# Patient Record
(demographics unavailable — no encounter records)

---

## 2024-10-18 NOTE — PROCEDURE
[No] : not [NSR] : normal sinus rhythm [Pacemaker] : pacemaker [DDDR] : DDDR [Voltage: ___ volts] : Voltage was [unfilled] volts [Normal] : The battery status is normal. [Lead Imp:  ___ohms] : lead impedance was [unfilled] ohms [Sensing Amplitude ___mv] : sensing amplitude was [unfilled] mv [___V @] : [unfilled] V [___ ms] : [unfilled] ms [Outputs/Safety Margin] : output changed to allow for adequate safety margin [Programmed for Longevity] : output reprogrammed for improved battery longevity [de-identified] : AS - VS @ 70 bpm [de-identified] : [employer] [de-identified] : Mino [id number] / [id number] [de-identified] : [id number] (RA) / [id number] (RV) [de-identified] : 09/19/2022 [de-identified] :  [de-identified] : 4.3 years (RA) / 8.7 years (RV) [de-identified] : AP 13%  1% Mode switch 11%

## 2024-10-18 NOTE — HISTORY OF PRESENT ILLNESS
[FreeTextEntry1] : Referring Cardiologist: Dr. Dex Herrera  CAD non-obstructive mitral valve repair 2004 and mitral valve repair and tricuspid valve repair 2017 Atrial Flutter on Warfarin; had DCCV DCCV 2017 given Amiodarone then stopped recurrence of Atrial Flutter in October 2021 presents in Nov 2021 with sinus bradycardia at 48 bpm given MCOT: sinus bradycardia and NSVT reports minimal symptoms unchanged from last visit  Patient s/p Dual Chamber Leadless Pacemaker Implant. Patient is doing great. No complaints.

## 2024-10-18 NOTE — CARDIOLOGY SUMMARY
[de-identified] : ECG (3/21/2022): sinus rhythm at 57 bpm, no significant ST/T abnormalities\par  ECG (2/7/2022): Ectopic atrial rhythm at 49 bpm, no significant ST/T abnormalities\par  ECG (11/29/2021): Ectopic atrial rhythm at 48 bpm, no significant ST/T abnormalities\par  ECG (10/07/2021): Atrial Flutter with 4:1 AV conduction\par  EKG atrial paced, rhythm at 72 BPM  [de-identified] : EVENT MONITOR/MCOT RESULT SUMMARY:\par  (see scanned report from tracings)\par  Dates: 11/29/2021 to 12/28/2021\par  Duration: 28 days\par  Average heart rate (bpm): 63\par  Range heart rate (bpm): \par  Tachyarrhythmias: no Atrial Fibrillation, no sustained SVT, no sustained VT\par  Bradyarrhythmias: no pauses, no significant bradycardia\par  PVC burden: 1%\par  APC burden: <1%\par  \par  SUMMARY: NSVT 10 beats at 176 bpm on 12/16/2021 at 22:48; sinus bradycardia; nocturnal bradycardia ~32 bpm\par   [de-identified] : Nuclear Stress Test (7/7/2020): no ischemia [de-identified] : 9/19/2022 - [facility] DR [de-identified] : Cardiac Cath (3/2/2022): non-obstructive CAD - mid LAD 40%\par  Cardiac Cath (2/24/2017): mid LAD 30-50% [de-identified] : Mitral Surgery (3/7/2017) [place]: mitral re-repair 23 mm MDT ATS band annuloplasty, tricuspid valve repair 28 mm MDT triad\par  Mitral Surgery (4/5/2004): Dr. Bernal: MV repair quadrangular resection of P2, #28 annuloplasty ring

## 2024-10-18 NOTE — ASSESSMENT
[FreeTextEntry1] : # Sinus Bradycardia and Sinus Node Dysfunction s/p Dual Chamber leadless pacemaker (AVEIR)  - Device interrogation showed normal function. AMS events c/w AF. Pt asymptomatic.   A - 4.3 years   V - 8.4 years   # AF // CHADSVASC score is 3. - I recommend to continue Warfarin. No bleeding reported.  # Hypertension - BP on the soft side but c/w with previous visits - F/U with gen cards.  # RTO in 6 months and prn   I have also advised the patient to go to the nearest emergency room if he experiences any chest pain, dyspnea, syncope, or has any other compelling symptoms.  F/U as previously scheduled. Pt would like device to be re-checked before his daughter's wedding in April.

## 2025-04-02 NOTE — PHYSICAL EXAM
[Normal Appearance] : normal appearance [General Appearance - In No Acute Distress] : no acute distress [Heart Rate And Rhythm] : heart rate and rhythm were normal [Heart Sounds] : normal S1 and S2 [] : no respiratory distress [Respiration, Rhythm And Depth] : normal respiratory rhythm and effort [Clean] : clean [Dry] : dry [Well-Healed] : well-healed [Nail Clubbing] : no clubbing of the fingernails [Cyanosis, Localized] : no localized cyanosis [Petechial Hemorrhages (___cm)] : no petechial hemorrhages [FreeTextEntry1] : right inguinal incision

## 2025-04-02 NOTE — PROCEDURE
[No] : not [NSR] : normal sinus rhythm [Pacemaker] : pacemaker [DDDR] : DDDR [Voltage: ___ volts] : Voltage was [unfilled] volts [Normal] : The battery status is normal. [Lead Imp:  ___ohms] : lead impedance was [unfilled] ohms [Sensing Amplitude ___mv] : sensing amplitude was [unfilled] mv [___V @] : [unfilled] V [___ ms] : [unfilled] ms [Outputs/Safety Margin] : output changed to allow for adequate safety margin [Programmed for Longevity] : output reprogrammed for improved battery longevity [de-identified] : AS - VS @ 70 bpm [de-identified] : [employer] [de-identified] : Mino [id number] / [id number] [de-identified] : [id number] (RA) / [id number] (RV) [de-identified] : 09/19/2022 [de-identified] :  [de-identified] : 4.3 years (RA) / 8.7 years (RV) [de-identified] : AP 13%  1% Mode switch 11%

## 2025-04-02 NOTE — CARDIOLOGY SUMMARY
[de-identified] : ECG (3/21/2022): sinus rhythm at 57 bpm, no significant ST/T abnormalities\par  ECG (2/7/2022): Ectopic atrial rhythm at 49 bpm, no significant ST/T abnormalities\par  ECG (11/29/2021): Ectopic atrial rhythm at 48 bpm, no significant ST/T abnormalities\par  ECG (10/07/2021): Atrial Flutter with 4:1 AV conduction\par  EKG atrial paced, rhythm at 72 BPM  [de-identified] : EVENT MONITOR/MCOT RESULT SUMMARY:\par  (see scanned report from tracings)\par  Dates: 11/29/2021 to 12/28/2021\par  Duration: 28 days\par  Average heart rate (bpm): 63\par  Range heart rate (bpm): \par  Tachyarrhythmias: no Atrial Fibrillation, no sustained SVT, no sustained VT\par  Bradyarrhythmias: no pauses, no significant bradycardia\par  PVC burden: 1%\par  APC burden: <1%\par  \par  SUMMARY: NSVT 10 beats at 176 bpm on 12/16/2021 at 22:48; sinus bradycardia; nocturnal bradycardia ~32 bpm\par   [de-identified] : Nuclear Stress Test (7/7/2020): no ischemia [de-identified] : 9/19/2022 - [facility] DR [de-identified] : Cardiac Cath (3/2/2022): non-obstructive CAD - mid LAD 40%\par  Cardiac Cath (2/24/2017): mid LAD 30-50% [de-identified] : Mitral Surgery (3/7/2017) [place]: mitral re-repair 23 mm MDT ATS band annuloplasty, tricuspid valve repair 28 mm MDT triad\par  Mitral Surgery (4/5/2004): Dr. Bernal: MV repair quadrangular resection of P2, #28 annuloplasty ring

## 2025-04-02 NOTE — CARDIOLOGY SUMMARY
[de-identified] : ECG (3/21/2022): sinus rhythm at 57 bpm, no significant ST/T abnormalities\par  ECG (2/7/2022): Ectopic atrial rhythm at 49 bpm, no significant ST/T abnormalities\par  ECG (11/29/2021): Ectopic atrial rhythm at 48 bpm, no significant ST/T abnormalities\par  ECG (10/07/2021): Atrial Flutter with 4:1 AV conduction\par  EKG atrial paced, rhythm at 72 BPM  [de-identified] : EVENT MONITOR/MCOT RESULT SUMMARY:\par  (see scanned report from tracings)\par  Dates: 11/29/2021 to 12/28/2021\par  Duration: 28 days\par  Average heart rate (bpm): 63\par  Range heart rate (bpm): \par  Tachyarrhythmias: no Atrial Fibrillation, no sustained SVT, no sustained VT\par  Bradyarrhythmias: no pauses, no significant bradycardia\par  PVC burden: 1%\par  APC burden: <1%\par  \par  SUMMARY: NSVT 10 beats at 176 bpm on 12/16/2021 at 22:48; sinus bradycardia; nocturnal bradycardia ~32 bpm\par   [de-identified] : Nuclear Stress Test (7/7/2020): no ischemia [de-identified] : 9/19/2022 - [facility] DR [de-identified] : Cardiac Cath (3/2/2022): non-obstructive CAD - mid LAD 40%\par  Cardiac Cath (2/24/2017): mid LAD 30-50% [de-identified] : Mitral Surgery (3/7/2017) [place]: mitral re-repair 23 mm MDT ATS band annuloplasty, tricuspid valve repair 28 mm MDT triad\par  Mitral Surgery (4/5/2004): Dr. Bernal: MV repair quadrangular resection of P2, #28 annuloplasty ring

## 2025-04-02 NOTE — PROCEDURE
[No] : not [NSR] : normal sinus rhythm [Pacemaker] : pacemaker [DDDR] : DDDR [Voltage: ___ volts] : Voltage was [unfilled] volts [Normal] : The battery status is normal. [Lead Imp:  ___ohms] : lead impedance was [unfilled] ohms [Sensing Amplitude ___mv] : sensing amplitude was [unfilled] mv [___V @] : [unfilled] V [___ ms] : [unfilled] ms [Outputs/Safety Margin] : output changed to allow for adequate safety margin [Programmed for Longevity] : output reprogrammed for improved battery longevity [de-identified] : AS - VS @ 70 bpm [de-identified] : [employer] [de-identified] : Mino [id number] / [id number] [de-identified] : [id number] (RA) / [id number] (RV) [de-identified] : 09/19/2022 [de-identified] :  [de-identified] : 4.3 years (RA) / 8.7 years (RV) [de-identified] : AP 13%  1% Mode switch 11%

## 2025-07-02 NOTE — PHYSICAL EXAM
[Normal Appearance] : normal appearance [General Appearance - In No Acute Distress] : no acute distress [Heart Rate And Rhythm] : heart rate and rhythm were normal [Heart Sounds] : normal S1 and S2 [] : no respiratory distress [Respiration, Rhythm And Depth] : normal respiratory rhythm and effort [Well-Healed] : well-healed [Nail Clubbing] : no clubbing of the fingernails [Cyanosis, Localized] : no localized cyanosis [Petechial Hemorrhages (___cm)] : no petechial hemorrhages [FreeTextEntry1] : right inguinal incision

## 2025-07-02 NOTE — ASSESSMENT
[FreeTextEntry1] : # Sinus Bradycardia and Sinus Node Dysfunction s/p Dual Chamber leadless pacemaker (AVEIR)  - Device interrogation showed normal function. Patient is in AFlutter   The patient is asymptomatic.   Mode: DDDR to AAIR+VVI 50 bpm for RA and 45 bpm for RV ** Need to add NAME to device at NOV     # AF // CHADSVASC score of 4. - Patient is in AFlutter - asymptomatic - I recommend continuing Warfarin. No bleeding reported.  # Hypertension-BP well controlled. - Continue Chlorthalidone 25 mg daily, Nifedipine 30 mg daily, Ramipril 5 mg twice a day and Metoprolol tartrate 25    mg twice a day.  I have also advised the patient to go to the nearest emergency room if he experiences any chest pain, dyspnea, syncope, or has any other compelling symptoms.  Follow-up in 5-6 months or prn.

## 2025-07-02 NOTE — PROCEDURE
[No] : not [NSR] : normal sinus rhythm [Pacemaker] : pacemaker [DDDR] : DDDR [Voltage: ___ volts] : Voltage was [unfilled] volts [Longevity: ___ months] : The estimated remaining battery life is [unfilled] months [Normal] : The battery status is normal. [___ ms] : [unfilled] ms [Outputs/Safety Margin] : output changed to allow for adequate safety margin [Programmed for Longevity] : output reprogrammed for improved battery longevity [Lead Imp:  ___ohms] : lead impedance was [unfilled] ohms [Sensing Amplitude ___mv] : sensing amplitude was [unfilled] mv [___V @] : [unfilled] V [de-identified] : AS - VS [de-identified] : [employer] [de-identified] : Mino [id number] / [id number] [de-identified] : [id number] (RA) / [id number] (RV) [de-identified] : 09/19/2022 [de-identified] :  [de-identified] : 5.1 years (RA) / 12.4 years (RV) [de-identified] : AP 12%  <1% Mode switch 7%

## 2025-07-02 NOTE — CARDIOLOGY SUMMARY
[de-identified] : 07/02/2025: A-flutter, V@ 67bpm ECG (3/21/2022): sinus rhythm at 57 bpm, no significant ST/T abnormalities ECG (2/7/2022): Ectopic atrial rhythm at 49 bpm, no significant ST/T abnormalities ECG (11/29/2021): Ectopic atrial rhythm at 48 bpm, no significant ST/T abnormalities ECG (10/07/2021): Atrial Flutter with 4:1 AV conduction EKG atrial paced, rhythm at 72 BPM  [de-identified] : EVENT MONITOR/MCOT RESULT SUMMARY:\par  (see scanned report from tracings)\par  Dates: 11/29/2021 to 12/28/2021\par  Duration: 28 days\par  Average heart rate (bpm): 63\par  Range heart rate (bpm): \par  Tachyarrhythmias: no Atrial Fibrillation, no sustained SVT, no sustained VT\par  Bradyarrhythmias: no pauses, no significant bradycardia\par  PVC burden: 1%\par  APC burden: <1%\par  \par  SUMMARY: NSVT 10 beats at 176 bpm on 12/16/2021 at 22:48; sinus bradycardia; nocturnal bradycardia ~32 bpm\par   [de-identified] : Nuclear Stress Test (7/7/2020): no ischemia [de-identified] : 9/19/2022 - [facility] DR [de-identified] : Cardiac Cath (3/2/2022): non-obstructive CAD - mid LAD 40%\par  Cardiac Cath (2/24/2017): mid LAD 30-50% [de-identified] : Mitral Surgery (3/7/2017) [place]: mitral re-repair 23 mm MDT ATS band annuloplasty, tricuspid valve repair 28 mm MDT triad\par  Mitral Surgery (4/5/2004): Dr. Bernal: MV repair quadrangular resection of P2, #28 annuloplasty ring

## 2025-07-02 NOTE — HISTORY OF PRESENT ILLNESS
[FreeTextEntry1] : Referring Cardiologist: Dr. Dex Herrera  CAD non-obstructive mitral valve repair 2004 and mitral valve repair and tricuspid valve repair 2017 Atrial Flutter on Warfarin; had DCCV DCCV 2017 given Amiodarone then stopped recurrence of Atrial Flutter in October 2021 presents in Nov 2021 with sinus bradycardia at 48 bpm given MCOT: sinus bradycardia and NSVT   Patient s/p Dual Chamber Leadless Pacemaker (AVEIR) Implant on 09/19/2022  He presents today for routine device interrogation. Accompanied by spouse  The patient denies chest pain/discomfort, dyspnea, palpitations, dizziness, lightheadedness and syncope.

## 2025-07-25 NOTE — HISTORY OF PRESENT ILLNESS
[FreeTextEntry1] : Mr. ROSIE KEVIN is a 77 year male who is seen in the office for xerosis, hammertoes, onychomycosis, calluses, bunions.  Patient denies any current or recent fever, chills, nausea, vomiting, SOB, or chest pain. AAOx3 and in NAD.  REVIEW OF SYSTEMS:      Musculoskeletal: + arthralgias + joint stiffness, but no joint swelling and no limb swelling.   Integumentary: +skin lesion +dry skin, but no skin wound and no itching.   Neurological: no confusion, no convulsions, no dizziness and no fainting.   Constitutional and Psychiatric are otherwise negative.  PHYSICAL EXAM:     Constitutional: alert, in no acute distress, well developed and normal voice and communication  Vascular:   capillary refills normal in right toes and capillary refills normal in the left toes right foot posterior tibialis 2+ and left foot posterior tibialis 2+ right foot dorsalis pedis 2+ and left foot dorsalis pedis 2+ skin temperature gradient is WNL to bilateral lower extremities.  Musculoskeletal: ROM of ankle, subtalar, midtarsal, MPJ, IPJ are adequate bilateral 5/5 muscle power in inversion, eversion, dorsiflexion, plantarflexion bilateral and normal strength/tone.  Skin: normal skin turgor and no foot ulcer. + Dry skin to bilateral lower extremities No open lesions, no cellulitis, no fluctuance, no ecchymosis, mild edema.  Neurological: Gross epicritic sensation to feet diminished, light touch sensation intact, sharp/dull sensation intact  Psychiatric: oriented to person, place, and time, insight and judgment were intact and the affect was normal.  ASSESSMENT/PLAN: 1) Onychomycosis with onychodystrophy/onychogryphosis: aseptic debridement of thick/elongated, incurvated, mycotic and dystrophic toenails with pain/discomfort x 6 done 07/22/2025 2) Callus/corns: aseptic debridement and paring of painful foot callus/corns x 1 (Right submet 5th) done 07/22/2025 3) Xerosis: recommend use of topical moisturizer BID 4) Hammertoes: recommend use of proper fit high toe box orthopedic shoes with accommodative insoles, check feet for any new lesions daily, seek treatment immediately if present 5) Bunion: recommend use of protective padding, use of proper fit wide toe box orthopedic shoes with accommodative insoles/orthotics  Patient tolerated all treatments well and without any complications Follow up in 2.5 months